# Patient Record
Sex: FEMALE | Race: AMERICAN INDIAN OR ALASKA NATIVE | NOT HISPANIC OR LATINO
[De-identification: names, ages, dates, MRNs, and addresses within clinical notes are randomized per-mention and may not be internally consistent; named-entity substitution may affect disease eponyms.]

---

## 2017-07-20 PROBLEM — Z00.00 ENCOUNTER FOR PREVENTIVE HEALTH EXAMINATION: Status: ACTIVE | Noted: 2017-07-20

## 2017-07-24 ENCOUNTER — RESULT CHARGE (OUTPATIENT)
Age: 32
End: 2017-07-24

## 2017-07-24 ENCOUNTER — APPOINTMENT (OUTPATIENT)
Dept: OBGYN | Facility: CLINIC | Age: 32
End: 2017-07-24

## 2017-07-24 ENCOUNTER — RESULT REVIEW (OUTPATIENT)
Age: 32
End: 2017-07-24

## 2017-07-24 ENCOUNTER — OUTPATIENT (OUTPATIENT)
Dept: OUTPATIENT SERVICES | Facility: HOSPITAL | Age: 32
LOS: 1 days | Discharge: HOME | End: 2017-07-24

## 2017-07-24 VITALS
SYSTOLIC BLOOD PRESSURE: 100 MMHG | DIASTOLIC BLOOD PRESSURE: 70 MMHG | HEIGHT: 62 IN | WEIGHT: 134 LBS | BODY MASS INDEX: 24.66 KG/M2

## 2017-07-24 LAB
BILIRUB UR QL STRIP: NORMAL
CLARITY UR: CLEAR
COLLECTION METHOD: NORMAL
GLUCOSE UR-MCNC: NORMAL
HCG UR QL: 0.2 EU/DL
HGB UR QL STRIP.AUTO: NORMAL
KETONES UR-MCNC: NORMAL
LEUKOCYTE ESTERASE UR QL STRIP: NORMAL
NITRITE UR QL STRIP: NORMAL
PH UR STRIP: 6.5
PROT UR STRIP-MCNC: NORMAL
SP GR UR STRIP: 1.01

## 2017-07-25 ENCOUNTER — RESULT REVIEW (OUTPATIENT)
Age: 32
End: 2017-07-25

## 2017-07-28 LAB — HPV E6+E7 MRNA CVX QL NAA+PROBE: NOT DETECTED

## 2017-08-02 ENCOUNTER — APPOINTMENT (OUTPATIENT)
Dept: ANTEPARTUM | Facility: CLINIC | Age: 32
End: 2017-08-02

## 2017-08-02 DIAGNOSIS — Z3A.12 12 WEEKS GESTATION OF PREGNANCY: ICD-10-CM

## 2017-08-03 DIAGNOSIS — O26.841 UTERINE SIZE-DATE DISCREPANCY, FIRST TRIMESTER: ICD-10-CM

## 2017-08-07 ENCOUNTER — RESULT REVIEW (OUTPATIENT)
Age: 32
End: 2017-08-07

## 2017-08-21 ENCOUNTER — APPOINTMENT (OUTPATIENT)
Dept: OBGYN | Facility: CLINIC | Age: 32
End: 2017-08-21

## 2017-08-21 ENCOUNTER — OUTPATIENT (OUTPATIENT)
Dept: OUTPATIENT SERVICES | Facility: HOSPITAL | Age: 32
LOS: 1 days | Discharge: HOME | End: 2017-08-21

## 2017-08-21 VITALS — WEIGHT: 134 LBS | DIASTOLIC BLOOD PRESSURE: 60 MMHG | BODY MASS INDEX: 24.51 KG/M2 | SYSTOLIC BLOOD PRESSURE: 104 MMHG

## 2017-08-23 ENCOUNTER — APPOINTMENT (OUTPATIENT)
Dept: ANTEPARTUM | Facility: CLINIC | Age: 32
End: 2017-08-23

## 2017-08-23 ENCOUNTER — OUTPATIENT (OUTPATIENT)
Dept: OUTPATIENT SERVICES | Facility: HOSPITAL | Age: 32
LOS: 1 days | Discharge: HOME | End: 2017-08-23

## 2017-08-25 ENCOUNTER — RESULT REVIEW (OUTPATIENT)
Age: 32
End: 2017-08-25

## 2017-08-28 LAB
ABO + RH BLD: NORMAL
BACTERIA UR CULT: NORMAL
BASOPHILS # BLD: 0.01 TH/MM3
BASOPHILS NFR BLD: 0.2 %
BLD GP AB SCN SERPL QL: NEGATIVE
DIFFERENTIAL METHOD BLD: NORMAL
EOSINOPHIL # BLD: 0.02 TH/MM3
EOSINOPHIL NFR BLD: 0.4 %
ERYTHROCYTE [DISTWIDTH] IN BLOOD BY AUTOMATED COUNT: 12.2 %
GAMMA INTERFERON BACKGROUND BLD IA-ACNC: 0.81 IU/ML
GRANULOCYTES # BLD: 3.87 TH/MM3
GRANULOCYTES NFR BLD: 68.8 %
HCT VFR BLD AUTO: 37.3 %
HGB BLD-MCNC: 12.8 G/DL
IMM GRANULOCYTES # BLD: 0.01 TH/MM3
IMM GRANULOCYTES NFR BLD: 0.2 %
LYMPHOCYTES # BLD: 1.25 TH/MM3
LYMPHOCYTES NFR BLD: 22.2 %
M TB IFN-G BLD-IMP: NEGATIVE
M TB IFN-G CD4+ T-CELLS BLD-ACNC: 0.05 IU/ML
MCH RBC QN AUTO: 31.8 PG
MCHC RBC AUTO-ENTMCNC: 34.3 G/DL
MCV RBC AUTO: 92.6 FL
MITOGEN IGNF BLD-ACNC: 5.83 IU/ML
MONOCYTES # BLD: 0.46 TH/MM3
MONOCYTES NFR BLD: 8.2 %
PLATELET # BLD: 191 TH/MM3
PMV BLD AUTO: 10.2 FL
RBC # BLD AUTO: 4.03 MIL/MM3
RPR SER QL: NONREACTIVE
WBC # BLD: 5.62 TH/MM3

## 2017-08-29 LAB
HBV SURFACE AG SER-ACNC: NONREACTIVE
HGB A MFR BLD: 97.1 %
HGB A2 MFR BLD: 2.9 %
HGB FRACT BLD ELPH CITRATE-IMP: NORMAL
RUBV IGG SER-ACNC: 3.9 INDEX
RUBV IGG SER-IMP: POSITIVE
VZV IGG FLD QL IA: 395.5 INDEX
VZV IGG FLD QL IA: POSITIVE

## 2017-09-06 LAB
AR GENE MUT ANL BLD/T: NORMAL
CFTR MUT ANL BLD/T: NEGATIVE
HIV1+2 AB SPEC QL IA.RAPID: NONREACTIVE
LEAD BLD-MCNC: <1 MCG/DL
SMN1(NORTH): NORMAL
SMN2(NORTH): NORMAL
SPECIMEN SOURCE: NORMAL
TECHNICAL RESULTS(NORTH): NORMAL

## 2017-09-15 ENCOUNTER — RESULT REVIEW (OUTPATIENT)
Age: 32
End: 2017-09-15

## 2017-09-15 LAB
C TRACH RRNA SPEC QL NAA+PROBE: NOT DETECTED
N GONORRHOEA RRNA SPEC QL NAA+PROBE: NOT DETECTED

## 2017-09-18 ENCOUNTER — APPOINTMENT (OUTPATIENT)
Dept: OBGYN | Facility: CLINIC | Age: 32
End: 2017-09-18

## 2017-09-18 ENCOUNTER — OUTPATIENT (OUTPATIENT)
Dept: OUTPATIENT SERVICES | Facility: HOSPITAL | Age: 32
LOS: 1 days | Discharge: HOME | End: 2017-09-18

## 2017-09-18 ENCOUNTER — RESULT CHARGE (OUTPATIENT)
Age: 32
End: 2017-09-18

## 2017-09-18 VITALS — WEIGHT: 139 LBS | BODY MASS INDEX: 25.42 KG/M2 | SYSTOLIC BLOOD PRESSURE: 90 MMHG | DIASTOLIC BLOOD PRESSURE: 60 MMHG

## 2017-09-18 LAB
BILIRUB UR QL STRIP: NEGATIVE
CLARITY UR: CLEAR
COLLECTION METHOD: NORMAL
GLUCOSE UR-MCNC: NEGATIVE
HCG UR QL: 0.2 EU/DL
HGB UR QL STRIP.AUTO: NEGATIVE
KETONES UR-MCNC: NEGATIVE
LEUKOCYTE ESTERASE UR QL STRIP: NEGATIVE
NITRITE UR QL STRIP: NEGATIVE
PH UR STRIP: 6.5
PROT UR STRIP-MCNC: NEGATIVE
SP GR UR STRIP: 1.01

## 2017-09-21 ENCOUNTER — RESULT REVIEW (OUTPATIENT)
Age: 32
End: 2017-09-21

## 2017-09-22 DIAGNOSIS — Z33.1 PREGNANT STATE, INCIDENTAL: ICD-10-CM

## 2017-09-27 LAB — MATERNAL ALPHA FETO (NORTH): NORMAL

## 2017-10-16 ENCOUNTER — RESULT CHARGE (OUTPATIENT)
Age: 32
End: 2017-10-16

## 2017-10-16 ENCOUNTER — APPOINTMENT (OUTPATIENT)
Dept: ANTEPARTUM | Facility: CLINIC | Age: 32
End: 2017-10-16

## 2017-10-16 ENCOUNTER — APPOINTMENT (OUTPATIENT)
Dept: OBGYN | Facility: CLINIC | Age: 32
End: 2017-10-16

## 2017-10-16 ENCOUNTER — OUTPATIENT (OUTPATIENT)
Dept: OUTPATIENT SERVICES | Facility: HOSPITAL | Age: 32
LOS: 1 days | Discharge: HOME | End: 2017-10-16

## 2017-10-16 VITALS — WEIGHT: 141 LBS | DIASTOLIC BLOOD PRESSURE: 60 MMHG | SYSTOLIC BLOOD PRESSURE: 100 MMHG | BODY MASS INDEX: 25.79 KG/M2

## 2017-10-18 DIAGNOSIS — O35.8XX0 MATERNAL CARE FOR OTHER (SUSPECTED) FETAL ABNORMALITY AND DAMAGE, NOT APPLICABLE OR UNSPECIFIED: ICD-10-CM

## 2017-10-18 DIAGNOSIS — Z33.1 PREGNANT STATE, INCIDENTAL: ICD-10-CM

## 2017-11-13 ENCOUNTER — OUTPATIENT (OUTPATIENT)
Dept: OUTPATIENT SERVICES | Facility: HOSPITAL | Age: 32
LOS: 1 days | Discharge: HOME | End: 2017-11-13

## 2017-11-13 ENCOUNTER — APPOINTMENT (OUTPATIENT)
Dept: OBGYN | Facility: CLINIC | Age: 32
End: 2017-11-13

## 2017-11-13 VITALS — BODY MASS INDEX: 27.18 KG/M2 | WEIGHT: 148.6 LBS

## 2017-11-13 DIAGNOSIS — Z3A.12 12 WEEKS GESTATION OF PREGNANCY: ICD-10-CM

## 2017-11-13 DIAGNOSIS — Z33.1 PREGNANT STATE, INCIDENTAL: ICD-10-CM

## 2017-11-13 LAB
BILIRUB UR QL STRIP: NEGATIVE
CLARITY UR: CLEAR
COLLECTION METHOD: NORMAL
GLUCOSE UR-MCNC: NEGATIVE
HCG UR QL: NEGATIVE EU/DL
HGB UR QL STRIP.AUTO: NORMAL
KETONES UR-MCNC: NORMAL
LEUKOCYTE ESTERASE UR QL STRIP: NEGATIVE
NITRITE UR QL STRIP: NEGATIVE
PH UR STRIP: 6.5
PROT UR STRIP-MCNC: NEGATIVE
SP GR UR STRIP: 1.02

## 2017-11-28 LAB
BASOPHILS # BLD: 0.01 TH/MM3
BASOPHILS NFR BLD: 0.1 %
DIFFERENTIAL METHOD BLD: NORMAL
EOSINOPHIL # BLD: 0.12 TH/MM3
EOSINOPHIL NFR BLD: 1.4 %
ERYTHROCYTE [DISTWIDTH] IN BLOOD BY AUTOMATED COUNT: 13 %
GLUCOSE 1H P GLC SERPL-MCNC: 138 MG/DL
GRANULOCYTES # BLD: 6.47 TH/MM3
GRANULOCYTES NFR BLD: 73.3 %
HCT VFR BLD AUTO: 36.4 %
HGB BLD-MCNC: 12.1 G/DL
IMM GRANULOCYTES # BLD: 0.06 TH/MM3
IMM GRANULOCYTES NFR BLD: 0.7 %
LYMPHOCYTES # BLD: 1.57 TH/MM3
LYMPHOCYTES NFR BLD: 17.8 %
MCH RBC QN AUTO: 32.7 PG
MCHC RBC AUTO-ENTMCNC: 33.2 G/DL
MCV RBC AUTO: 98.4 FL
MONOCYTES # BLD: 0.59 TH/MM3
MONOCYTES NFR BLD: 6.7 %
PLATELET # BLD: 219 TH/MM3
PMV BLD AUTO: 10.2 FL
RBC # BLD AUTO: 3.7 MIL/MM3
WBC # BLD: 8.82 TH/MM3

## 2017-12-11 ENCOUNTER — OUTPATIENT (OUTPATIENT)
Dept: OUTPATIENT SERVICES | Facility: HOSPITAL | Age: 32
LOS: 1 days | Discharge: HOME | End: 2017-12-11

## 2017-12-11 ENCOUNTER — APPOINTMENT (OUTPATIENT)
Dept: OBGYN | Facility: CLINIC | Age: 32
End: 2017-12-11

## 2017-12-11 VITALS — WEIGHT: 154 LBS | DIASTOLIC BLOOD PRESSURE: 60 MMHG | BODY MASS INDEX: 28.17 KG/M2 | SYSTOLIC BLOOD PRESSURE: 100 MMHG

## 2017-12-11 LAB
BILIRUB UR QL STRIP: NEGATIVE
CLARITY UR: CLEAR
COLLECTION METHOD: NORMAL
GLUCOSE UR-MCNC: NEGATIVE
HCG UR QL: NEGATIVE EU/DL
HGB UR QL STRIP.AUTO: NEGATIVE
KETONES UR-MCNC: NEGATIVE
LEUKOCYTE ESTERASE UR QL STRIP: NEGATIVE
NITRITE UR QL STRIP: NEGATIVE
PH UR STRIP: 6.5
PROT UR STRIP-MCNC: NEGATIVE
SP GR UR STRIP: 1.03

## 2018-01-08 ENCOUNTER — OUTPATIENT (OUTPATIENT)
Dept: OUTPATIENT SERVICES | Facility: HOSPITAL | Age: 33
LOS: 1 days | Discharge: HOME | End: 2018-01-08

## 2018-01-08 ENCOUNTER — APPOINTMENT (OUTPATIENT)
Dept: OBGYN | Facility: CLINIC | Age: 33
End: 2018-01-08

## 2018-01-08 VITALS — BODY MASS INDEX: 29.26 KG/M2 | SYSTOLIC BLOOD PRESSURE: 120 MMHG | WEIGHT: 160 LBS | DIASTOLIC BLOOD PRESSURE: 80 MMHG

## 2018-01-08 VITALS — SYSTOLIC BLOOD PRESSURE: 120 MMHG | WEIGHT: 160.1 LBS | BODY MASS INDEX: 29.28 KG/M2 | DIASTOLIC BLOOD PRESSURE: 80 MMHG

## 2018-01-22 ENCOUNTER — APPOINTMENT (OUTPATIENT)
Dept: OBGYN | Facility: CLINIC | Age: 33
End: 2018-01-22

## 2018-01-22 ENCOUNTER — OUTPATIENT (OUTPATIENT)
Dept: OUTPATIENT SERVICES | Facility: HOSPITAL | Age: 33
LOS: 1 days | Discharge: HOME | End: 2018-01-22

## 2018-01-22 ENCOUNTER — RESULT CHARGE (OUTPATIENT)
Age: 33
End: 2018-01-22

## 2018-01-22 VITALS — SYSTOLIC BLOOD PRESSURE: 110 MMHG | BODY MASS INDEX: 29.81 KG/M2 | WEIGHT: 163 LBS | DIASTOLIC BLOOD PRESSURE: 60 MMHG

## 2018-01-22 LAB
BILIRUB UR QL STRIP: NEGATIVE
CLARITY UR: CLEAR
COLLECTION METHOD: NORMAL
GLUCOSE UR-MCNC: NEGATIVE
HCG UR QL: NEGATIVE EU/DL
HGB UR QL STRIP.AUTO: NEGATIVE
KETONES UR-MCNC: NEGATIVE
LEUKOCYTE ESTERASE UR QL STRIP: NEGATIVE
NITRITE UR QL STRIP: NEGATIVE
PH UR STRIP: 6
PROT UR STRIP-MCNC: NEGATIVE
SP GR UR STRIP: 1.02

## 2018-01-24 ENCOUNTER — APPOINTMENT (OUTPATIENT)
Dept: ANTEPARTUM | Facility: CLINIC | Age: 33
End: 2018-01-24

## 2018-01-24 DIAGNOSIS — O40.3XX1 POLYHYDRAMNIOS, THIRD TRIMESTER, FETUS 1: ICD-10-CM

## 2018-01-24 DIAGNOSIS — O26.843 UTERINE SIZE-DATE DISCREPANCY, THIRD TRIMESTER: ICD-10-CM

## 2018-01-24 DIAGNOSIS — Z3A.35 35 WEEKS GESTATION OF PREGNANCY: ICD-10-CM

## 2018-01-24 DIAGNOSIS — Z34.90 ENCOUNTER FOR SUPERVISION OF NORMAL PREGNANCY, UNSPECIFIED, UNSPECIFIED TRIMESTER: ICD-10-CM

## 2018-01-29 ENCOUNTER — RESULT REVIEW (OUTPATIENT)
Age: 33
End: 2018-01-29

## 2018-01-29 ENCOUNTER — OUTPATIENT (OUTPATIENT)
Dept: OUTPATIENT SERVICES | Facility: HOSPITAL | Age: 33
LOS: 1 days | Discharge: HOME | End: 2018-01-29

## 2018-01-29 ENCOUNTER — APPOINTMENT (OUTPATIENT)
Dept: OBGYN | Facility: CLINIC | Age: 33
End: 2018-01-29

## 2018-01-29 VITALS — SYSTOLIC BLOOD PRESSURE: 112 MMHG | DIASTOLIC BLOOD PRESSURE: 68 MMHG | BODY MASS INDEX: 30.18 KG/M2 | WEIGHT: 165 LBS

## 2018-01-30 LAB
BASOPHILS # BLD: 0.01 TH/MM3
BASOPHILS NFR BLD: 0.1 %
CHLAMYDIA TRACHOMATIS(SIUH): NOT DETECTED
DIFFERENTIAL METHOD BLD: NORMAL
EOSINOPHIL # BLD: 0.1 TH/MM3
EOSINOPHIL NFR BLD: 0.9 %
ERYTHROCYTE [DISTWIDTH] IN BLOOD BY AUTOMATED COUNT: 12.4 %
GRANULOCYTES # BLD: 7.53 TH/MM3
GRANULOCYTES NFR BLD: 71 %
HCT VFR BLD AUTO: 36.3 %
HGB BLD-MCNC: 12.2 G/DL
IMM GRANULOCYTES # BLD: 0.06 TH/MM3
IMM GRANULOCYTES NFR BLD: 0.6 %
LYMPHOCYTES # BLD: 2.04 TH/MM3
LYMPHOCYTES NFR BLD: 19.2 %
MCH RBC QN AUTO: 31.8 PG
MCHC RBC AUTO-ENTMCNC: 33.6 G/DL
MCV RBC AUTO: 94.5 FL
MONOCYTES # BLD: 0.87 TH/MM3
MONOCYTES NFR BLD: 8.2 %
N GONORRHOEA RRNA SPEC QL NAA+PROBE: NOT DETECTED
PLATELET # BLD: 216 TH/MM3
PMV BLD AUTO: 10.1 FL
RBC # BLD AUTO: 3.84 MIL/MM3
WBC # BLD: 10.61 TH/MM3

## 2018-01-31 ENCOUNTER — APPOINTMENT (OUTPATIENT)
Dept: ANTEPARTUM | Facility: CLINIC | Age: 33
End: 2018-01-31

## 2018-01-31 ENCOUNTER — OUTPATIENT (OUTPATIENT)
Dept: OUTPATIENT SERVICES | Facility: HOSPITAL | Age: 33
LOS: 1 days | Discharge: HOME | End: 2018-01-31

## 2018-01-31 LAB
GP B STREP DNA SPEC QL NAA+PROBE: NORMAL
HIV1+2 AB SPEC QL IA.RAPID: NONREACTIVE
S PYO DNA THROAT QL NAA+PROBE: DETECTED
SPECIMEN SOURCE: NORMAL

## 2018-02-05 ENCOUNTER — OUTPATIENT (OUTPATIENT)
Dept: OUTPATIENT SERVICES | Facility: HOSPITAL | Age: 33
LOS: 1 days | Discharge: HOME | End: 2018-02-05

## 2018-02-05 ENCOUNTER — RESULT CHARGE (OUTPATIENT)
Age: 33
End: 2018-02-05

## 2018-02-05 ENCOUNTER — APPOINTMENT (OUTPATIENT)
Dept: OBGYN | Facility: CLINIC | Age: 33
End: 2018-02-05

## 2018-02-05 VITALS — DIASTOLIC BLOOD PRESSURE: 60 MMHG | WEIGHT: 293 LBS | SYSTOLIC BLOOD PRESSURE: 110 MMHG | BODY MASS INDEX: 301.24 KG/M2

## 2018-02-05 LAB
BILIRUB UR QL STRIP: NEGATIVE
CLARITY UR: CLEAR
COLLECTION METHOD: NORMAL
GLUCOSE UR-MCNC: 500
HCG UR QL: NEGATIVE EU/DL
HGB UR QL STRIP.AUTO: NEGATIVE
KETONES UR-MCNC: NEGATIVE
LEUKOCYTE ESTERASE UR QL STRIP: NEGATIVE
NITRITE UR QL STRIP: NEGATIVE
PH UR STRIP: 6.5
PROT UR STRIP-MCNC: NEGATIVE
SP GR UR STRIP: 1.03

## 2018-02-07 ENCOUNTER — OUTPATIENT (OUTPATIENT)
Dept: OUTPATIENT SERVICES | Facility: HOSPITAL | Age: 33
LOS: 1 days | Discharge: HOME | End: 2018-02-07

## 2018-02-07 ENCOUNTER — APPOINTMENT (OUTPATIENT)
Dept: ANTEPARTUM | Facility: CLINIC | Age: 33
End: 2018-02-07

## 2018-02-08 DIAGNOSIS — O40.3XX1 POLYHYDRAMNIOS, THIRD TRIMESTER, FETUS 1: ICD-10-CM

## 2018-02-08 DIAGNOSIS — Z3A.37 37 WEEKS GESTATION OF PREGNANCY: ICD-10-CM

## 2018-02-08 DIAGNOSIS — O34.211 MATERNAL CARE FOR LOW TRANSVERSE SCAR FROM PREVIOUS CESAREAN DELIVERY: ICD-10-CM

## 2018-02-12 ENCOUNTER — APPOINTMENT (OUTPATIENT)
Dept: OBGYN | Facility: CLINIC | Age: 33
End: 2018-02-12

## 2018-02-12 ENCOUNTER — RESULT CHARGE (OUTPATIENT)
Age: 33
End: 2018-02-12

## 2018-02-12 ENCOUNTER — OUTPATIENT (OUTPATIENT)
Dept: OUTPATIENT SERVICES | Facility: HOSPITAL | Age: 33
LOS: 1 days | Discharge: HOME | End: 2018-02-12

## 2018-02-12 VITALS — BODY MASS INDEX: 30.91 KG/M2 | SYSTOLIC BLOOD PRESSURE: 108 MMHG | DIASTOLIC BLOOD PRESSURE: 68 MMHG | WEIGHT: 169 LBS

## 2018-02-12 LAB
BILIRUB UR QL STRIP: NEGATIVE
CLARITY UR: CLEAR
COLLECTION METHOD: NORMAL
GLUCOSE UR-MCNC: 250
HCG UR QL: NEGATIVE EU/DL
HGB UR QL STRIP.AUTO: NEGATIVE
KETONES UR-MCNC: NEGATIVE
LEUKOCYTE ESTERASE UR QL STRIP: NEGATIVE
NITRITE UR QL STRIP: NEGATIVE
PH UR STRIP: 7
PROT UR STRIP-MCNC: NEGATIVE
SP GR UR STRIP: 1.02

## 2018-02-14 ENCOUNTER — OUTPATIENT (OUTPATIENT)
Dept: OUTPATIENT SERVICES | Facility: HOSPITAL | Age: 33
LOS: 1 days | Discharge: HOME | End: 2018-02-14

## 2018-02-14 ENCOUNTER — APPOINTMENT (OUTPATIENT)
Dept: ANTEPARTUM | Facility: CLINIC | Age: 33
End: 2018-02-14

## 2018-02-14 DIAGNOSIS — O34.219 MATERNAL CARE FOR UNSPECIFIED TYPE SCAR FROM PREVIOUS CESAREAN DELIVERY: ICD-10-CM

## 2018-02-14 DIAGNOSIS — O40.3XX1 POLYHYDRAMNIOS, THIRD TRIMESTER, FETUS 1: ICD-10-CM

## 2018-02-15 ENCOUNTER — LABORATORY RESULT (OUTPATIENT)
Age: 33
End: 2018-02-15

## 2018-02-21 ENCOUNTER — RESULT CHARGE (OUTPATIENT)
Age: 33
End: 2018-02-21

## 2018-02-21 ENCOUNTER — OUTPATIENT (OUTPATIENT)
Dept: OUTPATIENT SERVICES | Facility: HOSPITAL | Age: 33
LOS: 1 days | Discharge: HOME | End: 2018-02-21

## 2018-02-21 ENCOUNTER — APPOINTMENT (OUTPATIENT)
Dept: ANTEPARTUM | Facility: CLINIC | Age: 33
End: 2018-02-21

## 2018-02-21 ENCOUNTER — APPOINTMENT (OUTPATIENT)
Dept: OBGYN | Facility: CLINIC | Age: 33
End: 2018-02-21

## 2018-02-21 VITALS
WEIGHT: 167.38 LBS | BODY MASS INDEX: 30.8 KG/M2 | DIASTOLIC BLOOD PRESSURE: 60 MMHG | HEIGHT: 62 IN | SYSTOLIC BLOOD PRESSURE: 106 MMHG

## 2018-02-21 DIAGNOSIS — O40.3XX1 POLYHYDRAMNIOS, THIRD TRIMESTER, FETUS 1: ICD-10-CM

## 2018-02-21 DIAGNOSIS — Z3A.39 39 WEEKS GESTATION OF PREGNANCY: ICD-10-CM

## 2018-02-21 DIAGNOSIS — O34.219 MATERNAL CARE FOR UNSPECIFIED TYPE SCAR FROM PREVIOUS CESAREAN DELIVERY: ICD-10-CM

## 2018-02-21 LAB
BILIRUB UR QL STRIP: NORMAL
CLARITY UR: CLEAR
COLLECTION METHOD: NORMAL
GLUCOSE UR-MCNC: NORMAL
HCG UR QL: 0.2 EU/DL
HGB UR QL STRIP.AUTO: NORMAL
KETONES UR-MCNC: NORMAL
LEUKOCYTE ESTERASE UR QL STRIP: NORMAL
NITRITE UR QL STRIP: NORMAL
PH UR STRIP: 6
PROT UR STRIP-MCNC: NORMAL
SP GR UR STRIP: 1.03

## 2018-02-22 DIAGNOSIS — Z34.90 ENCOUNTER FOR SUPERVISION OF NORMAL PREGNANCY, UNSPECIFIED, UNSPECIFIED TRIMESTER: ICD-10-CM

## 2018-02-26 ENCOUNTER — OUTPATIENT (OUTPATIENT)
Dept: OUTPATIENT SERVICES | Facility: HOSPITAL | Age: 33
LOS: 1 days | Discharge: HOME | End: 2018-02-26

## 2018-02-26 ENCOUNTER — RESULT CHARGE (OUTPATIENT)
Age: 33
End: 2018-02-26

## 2018-02-26 ENCOUNTER — APPOINTMENT (OUTPATIENT)
Dept: OBGYN | Facility: CLINIC | Age: 33
End: 2018-02-26

## 2018-02-26 VITALS — SYSTOLIC BLOOD PRESSURE: 106 MMHG | DIASTOLIC BLOOD PRESSURE: 62 MMHG

## 2018-02-27 ENCOUNTER — INPATIENT (INPATIENT)
Facility: HOSPITAL | Age: 33
LOS: 2 days | Discharge: HOME | End: 2018-03-02
Attending: OBSTETRICS & GYNECOLOGY | Admitting: OBSTETRICS & GYNECOLOGY

## 2018-02-27 ENCOUNTER — RESULT REVIEW (OUTPATIENT)
Age: 33
End: 2018-02-27

## 2018-02-27 VITALS — SYSTOLIC BLOOD PRESSURE: 128 MMHG | TEMPERATURE: 99 F | DIASTOLIC BLOOD PRESSURE: 73 MMHG

## 2018-02-27 LAB
APPEARANCE UR: (no result)
BASOPHILS # BLD AUTO: 0.02 K/UL — SIGNIFICANT CHANGE UP (ref 0–0.2)
BASOPHILS NFR BLD AUTO: 0.3 % — SIGNIFICANT CHANGE UP (ref 0–1)
BILIRUB UR-MCNC: NEGATIVE — SIGNIFICANT CHANGE UP
BLD GP AB SCN SERPL QL: SIGNIFICANT CHANGE UP
COLOR SPEC: YELLOW — SIGNIFICANT CHANGE UP
DIFF PNL FLD: NEGATIVE — SIGNIFICANT CHANGE UP
EOSINOPHIL # BLD AUTO: 0.09 K/UL — SIGNIFICANT CHANGE UP (ref 0–0.7)
EOSINOPHIL NFR BLD AUTO: 1.1 % — SIGNIFICANT CHANGE UP (ref 0–8)
EPI CELLS # UR: (no result) /HPF
GLUCOSE UR QL: NEGATIVE MG/DL — SIGNIFICANT CHANGE UP
HCT VFR BLD CALC: 37.8 % — SIGNIFICANT CHANGE UP (ref 37–47)
HGB BLD-MCNC: 12.6 G/DL — LOW (ref 14–18)
IMM GRANULOCYTES NFR BLD AUTO: 0.4 % — HIGH (ref 0.1–0.3)
KETONES UR-MCNC: NEGATIVE — SIGNIFICANT CHANGE UP
LEUKOCYTE ESTERASE UR-ACNC: NEGATIVE — SIGNIFICANT CHANGE UP
LYMPHOCYTES # BLD AUTO: 1.9 K/UL — SIGNIFICANT CHANGE UP (ref 1.2–3.4)
LYMPHOCYTES # BLD AUTO: 24.2 % — SIGNIFICANT CHANGE UP (ref 20.5–51.1)
MCHC RBC-ENTMCNC: 30.7 PG — SIGNIFICANT CHANGE UP (ref 27–31)
MCHC RBC-ENTMCNC: 33.3 G/DL — SIGNIFICANT CHANGE UP (ref 32–37)
MCV RBC AUTO: 92 FL — HIGH (ref 81–91)
MONOCYTES # BLD AUTO: 0.63 K/UL — HIGH (ref 0.1–0.6)
MONOCYTES NFR BLD AUTO: 8 % — SIGNIFICANT CHANGE UP (ref 1.7–9.3)
NEUTROPHILS # BLD AUTO: 5.17 K/UL — SIGNIFICANT CHANGE UP (ref 1.4–6.5)
NEUTROPHILS NFR BLD AUTO: 66 % — SIGNIFICANT CHANGE UP (ref 42.2–75.2)
NITRITE UR-MCNC: NEGATIVE — SIGNIFICANT CHANGE UP
NRBC # BLD: 0 /100 WBCS — SIGNIFICANT CHANGE UP (ref 0–0)
PH UR: 6 — SIGNIFICANT CHANGE UP (ref 5–8)
PLATELET # BLD AUTO: 219 K/UL — SIGNIFICANT CHANGE UP (ref 130–400)
PRENATAL SYPHILIS TEST: SIGNIFICANT CHANGE UP
PROT UR-MCNC: NEGATIVE MG/DL — SIGNIFICANT CHANGE UP
RBC # BLD: 4.11 M/UL — LOW (ref 4.2–5.4)
RBC # FLD: 13.1 % — SIGNIFICANT CHANGE UP (ref 11.5–14.5)
SP GR SPEC: 1.02 — SIGNIFICANT CHANGE UP (ref 1.01–1.03)
TYPE + AB SCN PNL BLD: SIGNIFICANT CHANGE UP
UROBILINOGEN FLD QL: 0.2 MG/DL — SIGNIFICANT CHANGE UP (ref 0.2–0.2)
WBC # BLD: 7.84 K/UL — SIGNIFICANT CHANGE UP (ref 4.8–10.8)
WBC # FLD AUTO: 7.84 K/UL — SIGNIFICANT CHANGE UP (ref 4.8–10.8)

## 2018-02-27 RX ORDER — CEFAZOLIN SODIUM 1 G
2000 VIAL (EA) INJECTION ONCE
Qty: 0 | Refills: 0 | Status: COMPLETED | OUTPATIENT
Start: 2018-02-27 | End: 2018-02-27

## 2018-02-27 RX ORDER — LANOLIN
1 OINTMENT (GRAM) TOPICAL
Qty: 0 | Refills: 0 | Status: DISCONTINUED | OUTPATIENT
Start: 2018-02-27 | End: 2018-03-02

## 2018-02-27 RX ORDER — SODIUM CHLORIDE 9 MG/ML
1000 INJECTION, SOLUTION INTRAVENOUS
Qty: 0 | Refills: 0 | Status: DISCONTINUED | OUTPATIENT
Start: 2018-02-27 | End: 2018-03-02

## 2018-02-27 RX ORDER — CITRIC ACID/SODIUM CITRATE 300-500 MG
15 SOLUTION, ORAL ORAL EVERY 4 HOURS
Qty: 0 | Refills: 0 | Status: DISCONTINUED | OUTPATIENT
Start: 2018-02-27 | End: 2018-02-27

## 2018-02-27 RX ORDER — SIMETHICONE 80 MG/1
80 TABLET, CHEWABLE ORAL EVERY 4 HOURS
Qty: 0 | Refills: 0 | Status: DISCONTINUED | OUTPATIENT
Start: 2018-02-27 | End: 2018-03-02

## 2018-02-27 RX ORDER — ACETAMINOPHEN 500 MG
650 TABLET ORAL EVERY 6 HOURS
Qty: 0 | Refills: 0 | Status: DISCONTINUED | OUTPATIENT
Start: 2018-02-27 | End: 2018-03-02

## 2018-02-27 RX ORDER — OXYCODONE AND ACETAMINOPHEN 5; 325 MG/1; MG/1
2 TABLET ORAL EVERY 6 HOURS
Qty: 0 | Refills: 0 | Status: DISCONTINUED | OUTPATIENT
Start: 2018-02-27 | End: 2018-03-02

## 2018-02-27 RX ORDER — OXYTOCIN 10 UNIT/ML
333.33 VIAL (ML) INJECTION
Qty: 20 | Refills: 0 | Status: DISCONTINUED | OUTPATIENT
Start: 2018-02-27 | End: 2018-02-27

## 2018-02-27 RX ORDER — FERROUS SULFATE 325(65) MG
325 TABLET ORAL DAILY
Qty: 0 | Refills: 0 | Status: DISCONTINUED | OUTPATIENT
Start: 2018-02-27 | End: 2018-03-02

## 2018-02-27 RX ORDER — DOCUSATE SODIUM 100 MG
100 CAPSULE ORAL
Qty: 0 | Refills: 0 | Status: DISCONTINUED | OUTPATIENT
Start: 2018-02-27 | End: 2018-03-02

## 2018-02-27 RX ORDER — SODIUM CHLORIDE 9 MG/ML
1000 INJECTION, SOLUTION INTRAVENOUS
Qty: 0 | Refills: 0 | Status: DISCONTINUED | OUTPATIENT
Start: 2018-02-27 | End: 2018-02-27

## 2018-02-27 RX ORDER — ENOXAPARIN SODIUM 100 MG/ML
40 INJECTION SUBCUTANEOUS DAILY
Qty: 0 | Refills: 0 | Status: DISCONTINUED | OUTPATIENT
Start: 2018-02-27 | End: 2018-03-02

## 2018-02-27 RX ORDER — DIPHENHYDRAMINE HCL 50 MG
25 CAPSULE ORAL EVERY 6 HOURS
Qty: 0 | Refills: 0 | Status: DISCONTINUED | OUTPATIENT
Start: 2018-02-27 | End: 2018-03-02

## 2018-02-27 RX ORDER — OXYTOCIN 10 UNIT/ML
41.67 VIAL (ML) INJECTION
Qty: 20 | Refills: 0 | Status: DISCONTINUED | OUTPATIENT
Start: 2018-02-27 | End: 2018-03-02

## 2018-02-27 RX ORDER — CEFAZOLIN SODIUM 1 G
1000 VIAL (EA) INJECTION EVERY 8 HOURS
Qty: 0 | Refills: 0 | Status: DISCONTINUED | OUTPATIENT
Start: 2018-02-27 | End: 2018-02-27

## 2018-02-27 RX ORDER — CEFAZOLIN SODIUM 1 G
VIAL (EA) INJECTION
Qty: 0 | Refills: 0 | Status: DISCONTINUED | OUTPATIENT
Start: 2018-02-27 | End: 2018-02-27

## 2018-02-27 RX ORDER — OXYCODONE AND ACETAMINOPHEN 5; 325 MG/1; MG/1
1 TABLET ORAL
Qty: 0 | Refills: 0 | Status: DISCONTINUED | OUTPATIENT
Start: 2018-02-27 | End: 2018-03-02

## 2018-02-27 RX ORDER — SODIUM CHLORIDE 9 MG/ML
300 INJECTION, SOLUTION INTRAVENOUS ONCE
Qty: 0 | Refills: 0 | Status: DISCONTINUED | OUTPATIENT
Start: 2018-02-27 | End: 2018-02-27

## 2018-02-27 RX ORDER — IBUPROFEN 200 MG
600 TABLET ORAL EVERY 6 HOURS
Qty: 0 | Refills: 0 | Status: DISCONTINUED | OUTPATIENT
Start: 2018-02-27 | End: 2018-03-02

## 2018-02-27 RX ORDER — CEFAZOLIN SODIUM 1 G
2 VIAL (EA) INJECTION EVERY 8 HOURS
Qty: 0 | Refills: 0 | Status: COMPLETED | OUTPATIENT
Start: 2018-02-27 | End: 2018-02-27

## 2018-02-27 RX ADMIN — SODIUM CHLORIDE 125 MILLILITER(S): 9 INJECTION, SOLUTION INTRAVENOUS at 08:10

## 2018-02-27 RX ADMIN — Medication 100 MILLIGRAM(S): at 21:44

## 2018-02-27 RX ADMIN — Medication 100 MILLIGRAM(S): at 08:09

## 2018-02-27 RX ADMIN — Medication 100 MILLIGRAM(S): at 15:44

## 2018-02-27 NOTE — PROGRESS NOTE ADULT - SUBJECTIVE AND OBJECTIVE BOX
JEANNINE JOHNSON  32y  Female    PGY1 Note:    Pt recovering well, no acute complaints.     Ambulating: No  Voiding: Benson in situ  Flatus: No  Bowel movements: No  Breast or bottle feeding: Breast feeding  Diet: Clears    MEDICATIONS  (STANDING):  ceFAZolin   IVPB 2 milliGRAM(s) IV Intermittent every 8 hours  enoxaparin Injectable 40 milliGRAM(s) SubCutaneous daily  ferrous    sulfate 325 milliGRAM(s) Oral daily  lactated ringers. 1000 milliLiter(s) (125 mL/Hr) IV Continuous <Continuous>  oxytocin Infusion 41.667 milliUNIT(s)/Min (125 mL/Hr) IV Continuous <Continuous>  prenatal multivitamin 1 Tablet(s) Oral daily    MEDICATIONS  (PRN):  acetaminophen   Tablet 650 milliGRAM(s) Oral every 6 hours PRN For Temp greater than 38.5 C (101.3 F)  diphenhydrAMINE   Capsule 25 milliGRAM(s) Oral every 6 hours PRN Itching  docusate sodium 100 milliGRAM(s) Oral two times a day PRN Stool Softening  ibuprofen  Tablet 600 milliGRAM(s) Oral every 6 hours PRN Mild pain or headache  lanolin Ointment 1 Application(s) Topical every 3 hours PRN Sore Nipples  oxyCODONE    5 mG/acetaminophen 325 mG 1 Tablet(s) Oral every 3 hours PRN Moderate Pain  oxyCODONE    5 mG/acetaminophen 325 mG 2 Tablet(s) Oral every 6 hours PRN Severe Pain  simethicone 80 milliGRAM(s) Chew every 4 hours PRN Gas      PAST MEDICAL & SURGICAL HISTORY:  No pertinent past medical history  Delivery of first pregnancy by  section      Physical Exam  Vital Signs Last 24 Hrs  T(C): 36 (2018 15:15), Max: 37.1 (2018 07:01)  T(F): 96.8 (2018 15:15), Max: 98.7 (2018 07:01)  HR: 66 (2018 15:15) (60 - 95)  BP: 98/50 (2018 15:15) (95/58 - 128/73)  RR: 20 (2018 13:59) (16 - 20)  Gen: AAOx3, NAD  Fundus: firm, below umbilicus   Wound: dressing in place, c/d/i   Abd: Soft, nontender, nondistended, BS+  Ext: No calf tenderness, no swelling, SCDs in situ   UO: 240cc/4h->60cc/hr (adequate)     Labs:                        12.6   7.84  )-----------( 219      ( 2018 06:53 )             37.8        A/P: S/P C/S POD0  , recovering well   - d/c benson as per duramorph at 0016 if UO remains adequate  - f/u AM CBC   - dressing change in AM, advance diet when passing flatus  - monitor vital signs, bleeding   - pain mgmt prn

## 2018-02-27 NOTE — OB PROVIDER H&P - HISTORY OF PRESENT ILLNESS
32y.o.  @ 39.6wks by 1st trimester sono presents for elective repeat . Pt denies ctx, ROM, VB and states good FM.

## 2018-02-27 NOTE — OB PROVIDER H&P - NS_OBGYNHISTORY_OBGYN_ALL_OB_FT
2012 FT  9lb2oz arrest of dilation in [facility], LFTCS-pt has vertical skin incision

## 2018-02-27 NOTE — PROGRESS NOTE ADULT - SUBJECTIVE AND OBJECTIVE BOX
Pt underwent an uncomplicated repeat  section via lft/uterine incision and vertical skin incision. Please see resident's note for details.

## 2018-02-27 NOTE — OB PROVIDER H&P - ASSESSMENT
32y.o.  @ 39.6wks for elective repeat , Suspected GDM, polyhydramnios  - Admit to L&D  - IVF, labs  - Continuous efm and toco  - SCD's  - Schaefer catheter  - Abdominal prep  - Ancef prior to OR  Dr. SELINA De La Fuente aware

## 2018-02-27 NOTE — BRIEF OPERATIVE NOTE - PRE-OP DX
39 weeks gestation of pregnancy  2018    Active  Abigail Nunez  History of  section  2018    Active  Abigail Nunez

## 2018-02-27 NOTE — BRIEF OPERATIVE NOTE - OPERATION/FINDINGS
normal appearing uterus, bilateral fallopian tubes and ovaries, live female infant in vertex presentation, meconium stained amniotic fluid, APGARs 9/9 at 1 and 5 minute respectively

## 2018-02-27 NOTE — OB PROVIDER H&P - NSHPLABSRESULTS_GEN_ALL_CORE
Sono @ 37wks vtx, MVP 9.2, BPP 8/8  Sono @ 36wks vtx, BPP 8/8, poly  Sono @ 35wks vtx, EFW: 2918g, 66%, BPP 8/8, Poly  Sono @ 20.5wks nl anatomy  Sono @ 13wks S=D, NT WNL  Sono @ 10wks For dating    2/15/18 Hgb A1c 5.8

## 2018-02-28 ENCOUNTER — APPOINTMENT (OUTPATIENT)
Dept: ANTEPARTUM | Facility: CLINIC | Age: 33
End: 2018-02-28

## 2018-02-28 DIAGNOSIS — Z34.90 ENCOUNTER FOR SUPERVISION OF NORMAL PREGNANCY, UNSPECIFIED, UNSPECIFIED TRIMESTER: ICD-10-CM

## 2018-02-28 LAB
AMPHET UR-MCNC: NEGATIVE — SIGNIFICANT CHANGE UP
BARBITURATES UR SCN-MCNC: NEGATIVE — SIGNIFICANT CHANGE UP
BASOPHILS # BLD AUTO: 0.02 K/UL — SIGNIFICANT CHANGE UP (ref 0–0.2)
BASOPHILS NFR BLD AUTO: 0.2 % — SIGNIFICANT CHANGE UP (ref 0–1)
BENZODIAZ UR-MCNC: NEGATIVE — SIGNIFICANT CHANGE UP
COCAINE METAB.OTHER UR-MCNC: NEGATIVE — SIGNIFICANT CHANGE UP
EOSINOPHIL # BLD AUTO: 0.07 K/UL — SIGNIFICANT CHANGE UP (ref 0–0.7)
EOSINOPHIL NFR BLD AUTO: 0.6 % — SIGNIFICANT CHANGE UP (ref 0–8)
HCT VFR BLD CALC: 31.6 % — LOW (ref 37–47)
HGB BLD-MCNC: 10.5 G/DL — LOW (ref 14–18)
IMM GRANULOCYTES NFR BLD AUTO: 0.5 % — HIGH (ref 0.1–0.3)
LYMPHOCYTES # BLD AUTO: 1.67 K/UL — SIGNIFICANT CHANGE UP (ref 1.2–3.4)
LYMPHOCYTES # BLD AUTO: 13.9 % — LOW (ref 20.5–51.1)
MCHC RBC-ENTMCNC: 30.4 PG — SIGNIFICANT CHANGE UP (ref 27–31)
MCHC RBC-ENTMCNC: 33.2 G/DL — SIGNIFICANT CHANGE UP (ref 32–37)
MCV RBC AUTO: 91.6 FL — HIGH (ref 81–91)
METHADONE UR-MCNC: NEGATIVE — SIGNIFICANT CHANGE UP
MONOCYTES # BLD AUTO: 0.63 K/UL — HIGH (ref 0.1–0.6)
MONOCYTES NFR BLD AUTO: 5.2 % — SIGNIFICANT CHANGE UP (ref 1.7–9.3)
NEUTROPHILS # BLD AUTO: 9.56 K/UL — HIGH (ref 1.4–6.5)
NEUTROPHILS NFR BLD AUTO: 79.6 % — HIGH (ref 42.2–75.2)
NRBC # BLD: 0 /100 WBCS — SIGNIFICANT CHANGE UP (ref 0–0)
OPIATES UR-MCNC: NEGATIVE — SIGNIFICANT CHANGE UP
PCP SPEC-MCNC: SIGNIFICANT CHANGE UP
PLATELET # BLD AUTO: 207 K/UL — SIGNIFICANT CHANGE UP (ref 130–400)
PROPOXYPHENE QUALITATIVE URINE RESULT: NEGATIVE — SIGNIFICANT CHANGE UP
RBC # BLD: 3.45 M/UL — LOW (ref 4.2–5.4)
RBC # FLD: 13.1 % — SIGNIFICANT CHANGE UP (ref 11.5–14.5)
WBC # BLD: 12.01 K/UL — HIGH (ref 4.8–10.8)
WBC # FLD AUTO: 12.01 K/UL — HIGH (ref 4.8–10.8)

## 2018-02-28 RX ADMIN — Medication 600 MILLIGRAM(S): at 17:37

## 2018-02-28 RX ADMIN — ENOXAPARIN SODIUM 40 MILLIGRAM(S): 100 INJECTION SUBCUTANEOUS at 11:51

## 2018-02-28 RX ADMIN — Medication 600 MILLIGRAM(S): at 16:30

## 2018-02-28 RX ADMIN — Medication 1 TABLET(S): at 11:51

## 2018-02-28 RX ADMIN — Medication 325 MILLIGRAM(S): at 11:51

## 2018-02-28 RX ADMIN — OXYCODONE AND ACETAMINOPHEN 1 TABLET(S): 5; 325 TABLET ORAL at 01:36

## 2018-02-28 NOTE — PROGRESS NOTE ADULT - SUBJECTIVE AND OBJECTIVE BOX
Postpartum Note,  Section  She is a  32y woman who is now post-operative day: 1  Vertical skin incision (same incision from previous surgery used).    Subjective:  The patient feels well.  She is OOB to chair ambulating.   She is tolerating clears, will advance to regular diet.  She denies nausea and vomiting.  She has voided x2 since benson removed.  Her pain is controlled.  She reports normal postpartum bleeding.  She is breastfeeding and bottle feeding.    Physical exam:    Vital Signs Last 24 Hrs  T(F): 96.2 (2018 03:31), Max: 98.7 (2018 07:01)  HR: 65   BP: 98/53   BP range: 95/58 - 121/73  RR: 19  SpO2: 100%   I&O's Summary    2018 07:01  -  2018 06:51  --------------------------------------------------------  IN: 3975 mL / OUT: 3050 mL / NET: 925 mL    Gen: NAD  Breast: Soft, nontender, not engorged.  Abdomen: Soft, nontender, no distension , firm uterine fundus at umbilicus.  Incision: dressing changed, vertical skin incision clean, dry, and intact with steri strips  Pelvic: Normal lochia noted  Ext: No calf tenderness, no edema    LABS:                        12.6   7.84  )-----------( 219                   37.8         Assessment and Plan  POD #1 s/p  section  Doing well.  Encourage ambulation.  Advance to regular diet.

## 2018-03-01 RX ADMIN — SIMETHICONE 80 MILLIGRAM(S): 80 TABLET, CHEWABLE ORAL at 01:31

## 2018-03-01 RX ADMIN — Medication 600 MILLIGRAM(S): at 23:27

## 2018-03-01 RX ADMIN — Medication 600 MILLIGRAM(S): at 08:23

## 2018-03-01 RX ADMIN — Medication 1 TABLET(S): at 11:42

## 2018-03-01 RX ADMIN — Medication 600 MILLIGRAM(S): at 13:51

## 2018-03-01 RX ADMIN — Medication 600 MILLIGRAM(S): at 01:28

## 2018-03-01 RX ADMIN — Medication 600 MILLIGRAM(S): at 22:57

## 2018-03-01 RX ADMIN — ENOXAPARIN SODIUM 40 MILLIGRAM(S): 100 INJECTION SUBCUTANEOUS at 11:45

## 2018-03-01 RX ADMIN — Medication 600 MILLIGRAM(S): at 11:43

## 2018-03-01 RX ADMIN — Medication 325 MILLIGRAM(S): at 11:44

## 2018-03-01 NOTE — PROGRESS NOTE ADULT - SUBJECTIVE AND OBJECTIVE BOX
Postpartum Note,  Section  She is a  32y woman who is now post-operative day: 2    Subjective:  The patient feels well.  She is ambulating.   She is tolerating regular diet.  She denies nausea and vomiting.  She is voiding.  Her pain is controlled.  She reports normal postpartum bleeding.  She is breastfeeding and bottle feeding.    Physical exam:    Vital Signs Last 24 Hrs  T(F): 96.9, Max: 98.1  HR: 88  BP: 123/70   RR: 18     Gen: NAD  Breast: Soft, nontender, not engorged.  Abdomen: Soft, nontender, no distension , firm uterine fundus at umbilicus.  Incision: Clean, dry, and intact with steri strips  Pelvic: Normal lochia noted  Ext: No calf tenderness    LABS:                        10.5   12.01 )-----------( 207      ( 2018 06:17 ) --> postop             31.6                         12.6   7.84  )-----------( 219      ( 2018 06:53 ) --> preop             37.8     Assessment and Plan  POD #2 s/p repeat  section  Doing well.  Encourage ambulation.  Anticipate discharge home tomorrow.

## 2018-03-02 ENCOUNTER — TRANSCRIPTION ENCOUNTER (OUTPATIENT)
Age: 33
End: 2018-03-02

## 2018-03-02 VITALS
RESPIRATION RATE: 16 BRPM | SYSTOLIC BLOOD PRESSURE: 120 MMHG | DIASTOLIC BLOOD PRESSURE: 79 MMHG | HEART RATE: 77 BPM | TEMPERATURE: 98 F

## 2018-03-02 RX ORDER — IBUPROFEN 200 MG
1 TABLET ORAL
Qty: 0 | Refills: 0 | COMMUNITY
Start: 2018-03-02

## 2018-03-02 RX ORDER — ACETAMINOPHEN 500 MG
2 TABLET ORAL
Qty: 0 | Refills: 0 | COMMUNITY
Start: 2018-03-02

## 2018-03-02 NOTE — DISCHARGE NOTE OB - MATERIALS PROVIDED
[facility] Bouse Screening Program/Bouse  Immunization Record/Shaken Baby Prevention Handout/Vaccinations/Tdap Vaccination (VIS Pub Date: 2012)/MMR Vaccination (VIS Pub Date: 2012)/Birth Certificate Instructions/[facility] Hearing Screen Program

## 2018-03-02 NOTE — PROGRESS NOTE ADULT - SUBJECTIVE AND OBJECTIVE BOX
Postpartum Note,  Section  She is a  32y woman who is now post-operative day: 3    Subjective:  The patient feels well.  She is ambulating.   She is tolerating regular diet.  She denies nausea and vomiting.  She is voiding.  Her pain is controlled.  She reports normal postpartum bleeding.  She is breastfeeding.  She is formula feeding.    Physical exam:    Vital Signs Last 24 Hrs  T(F): 97.9   HR: 70  BP: 124/70  RR: 18 (02 Mar 2018 00:00) (18 - 18)    Gen: NAD  Breast: Soft, nontender, not engorged.  Abdomen: Soft, nontender, no distension , firm uterine fundus at umbilicus.  Incision: Clean, dry, and intact with steri strips  Pelvic: Normal lochia noted  Ext: No calf tenderness    LABS:                        10.5   12.01 )-----------( 207      ( 2018 06:17 )             31.6       Assessment and Plan  POD #3 s/p  section  Doing well.  Pain controlled with motrin.  Discharge home today with 1 week follow up.

## 2018-03-02 NOTE — DISCHARGE NOTE OB - CARE PLAN
Principal Discharge DX:	Delivered by  section  Goal:	healthy mother  Assessment and plan of treatment:	keep incision clean and dry, wash with soap and water, nothing in the vagina for 6 weeks (no tampons, sexual intercourse, douching, tub baths, swimming pools) may shower

## 2018-03-02 NOTE — DISCHARGE NOTE OB - MEDICATION SUMMARY - MEDICATIONS TO TAKE
I will START or STAY ON the medications listed below when I get home from the hospital:    acetaminophen 325 mg oral tablet  -- 2 tab(s) by mouth every 6 hours, As needed, For Temp greater than 38.5 C (101.3 F)  -- Indication: For pain    ibuprofen 600 mg oral tablet  -- 1 tab(s) by mouth every 6 hours, As needed, Mild pain or headache  -- Indication: For pain

## 2018-03-02 NOTE — DISCHARGE NOTE OB - PLAN OF CARE
healthy mother keep incision clean and dry, wash with soap and water, nothing in the vagina for 6 weeks (no tampons, sexual intercourse, douching, tub baths, swimming pools) may shower

## 2018-03-02 NOTE — DISCHARGE NOTE OB - CARE PROVIDER_API CALL
Cem Krishnamurthy), Obstetrics and Gynecology  [facility] [place] [address]  Phone: [phone]  Fax: [phone] Vesta De La Fuente), Obstetrics and Gynecology  [address] [place] [address]  Phone: [phone]  Fax: [phone]

## 2018-03-02 NOTE — DISCHARGE NOTE OB - PATIENT PORTAL LINK FT
You can access the [facility] Patient Portal, offered by [facility], by registering with the following website: [email or web address]

## 2018-03-06 DIAGNOSIS — N73.6 FEMALE PELVIC PERITONEAL ADHESIONS (POSTINFECTIVE): ICD-10-CM

## 2018-03-06 DIAGNOSIS — O99.89 OTHER SPECIFIED DISEASES AND CONDITIONS COMPLICATING PREGNANCY, CHILDBIRTH AND THE PUERPERIUM: ICD-10-CM

## 2018-03-06 DIAGNOSIS — N85.8 OTHER SPECIFIED NONINFLAMMATORY DISORDERS OF UTERUS: ICD-10-CM

## 2018-03-06 DIAGNOSIS — Z33.1 PREGNANT STATE, INCIDENTAL: ICD-10-CM

## 2018-03-06 DIAGNOSIS — O34.212 MATERNAL CARE FOR VERTICAL SCAR FROM PREVIOUS CESAREAN DELIVERY: ICD-10-CM

## 2018-03-06 DIAGNOSIS — Z3A.39 39 WEEKS GESTATION OF PREGNANCY: ICD-10-CM

## 2018-03-08 ENCOUNTER — OUTPATIENT (OUTPATIENT)
Dept: OUTPATIENT SERVICES | Facility: HOSPITAL | Age: 33
LOS: 1 days | Discharge: HOME | End: 2018-03-08

## 2018-03-08 ENCOUNTER — APPOINTMENT (OUTPATIENT)
Dept: OBGYN | Facility: CLINIC | Age: 33
End: 2018-03-08

## 2018-03-08 VITALS — HEIGHT: 62 IN | DIASTOLIC BLOOD PRESSURE: 76 MMHG | SYSTOLIC BLOOD PRESSURE: 120 MMHG

## 2018-03-08 DIAGNOSIS — Z51.89 ENCOUNTER FOR OTHER SPECIFIED AFTERCARE: ICD-10-CM

## 2018-03-08 LAB — SURGICAL PATHOLOGY STUDY: SIGNIFICANT CHANGE UP

## 2018-03-16 ENCOUNTER — APPOINTMENT (OUTPATIENT)
Dept: OBGYN | Facility: CLINIC | Age: 33
End: 2018-03-16

## 2018-03-16 ENCOUNTER — OUTPATIENT (OUTPATIENT)
Dept: OUTPATIENT SERVICES | Facility: HOSPITAL | Age: 33
LOS: 1 days | Discharge: HOME | End: 2018-03-16

## 2018-03-16 VITALS — SYSTOLIC BLOOD PRESSURE: 110 MMHG | BODY MASS INDEX: 26.89 KG/M2 | WEIGHT: 147 LBS | DIASTOLIC BLOOD PRESSURE: 60 MMHG

## 2018-03-22 ENCOUNTER — APPOINTMENT (OUTPATIENT)
Dept: OBGYN | Facility: CLINIC | Age: 33
End: 2018-03-22

## 2018-03-22 ENCOUNTER — OUTPATIENT (OUTPATIENT)
Dept: OUTPATIENT SERVICES | Facility: HOSPITAL | Age: 33
LOS: 1 days | Discharge: HOME | End: 2018-03-22

## 2018-03-22 VITALS — DIASTOLIC BLOOD PRESSURE: 60 MMHG | WEIGHT: 146 LBS | BODY MASS INDEX: 26.7 KG/M2 | SYSTOLIC BLOOD PRESSURE: 100 MMHG

## 2018-04-05 ENCOUNTER — APPOINTMENT (OUTPATIENT)
Dept: OBGYN | Facility: CLINIC | Age: 33
End: 2018-04-05

## 2018-04-05 ENCOUNTER — OUTPATIENT (OUTPATIENT)
Dept: OUTPATIENT SERVICES | Facility: HOSPITAL | Age: 33
LOS: 1 days | Discharge: HOME | End: 2018-04-05

## 2018-04-05 VITALS — WEIGHT: 144 LBS | BODY MASS INDEX: 26.34 KG/M2 | DIASTOLIC BLOOD PRESSURE: 64 MMHG | SYSTOLIC BLOOD PRESSURE: 106 MMHG

## 2018-04-12 ENCOUNTER — APPOINTMENT (OUTPATIENT)
Dept: OBGYN | Facility: CLINIC | Age: 33
End: 2018-04-12

## 2018-04-12 ENCOUNTER — OUTPATIENT (OUTPATIENT)
Dept: OUTPATIENT SERVICES | Facility: HOSPITAL | Age: 33
LOS: 1 days | Discharge: HOME | End: 2018-04-12

## 2018-04-12 VITALS — BODY MASS INDEX: 26.16 KG/M2 | DIASTOLIC BLOOD PRESSURE: 60 MMHG | SYSTOLIC BLOOD PRESSURE: 90 MMHG | WEIGHT: 143 LBS

## 2018-05-10 ENCOUNTER — OUTPATIENT (OUTPATIENT)
Dept: OUTPATIENT SERVICES | Facility: HOSPITAL | Age: 33
LOS: 1 days | Discharge: HOME | End: 2018-05-10

## 2018-05-10 ENCOUNTER — APPOINTMENT (OUTPATIENT)
Dept: OBGYN | Facility: CLINIC | Age: 33
End: 2018-05-10

## 2018-05-10 VITALS — BODY MASS INDEX: 26.89 KG/M2 | WEIGHT: 147 LBS | DIASTOLIC BLOOD PRESSURE: 60 MMHG | SYSTOLIC BLOOD PRESSURE: 100 MMHG

## 2018-05-11 DIAGNOSIS — Z30.09 ENCOUNTER FOR OTHER GENERAL COUNSELING AND ADVICE ON CONTRACEPTION: ICD-10-CM

## 2019-12-04 ENCOUNTER — APPOINTMENT (OUTPATIENT)
Dept: ANTEPARTUM | Facility: CLINIC | Age: 34
End: 2019-12-04
Payer: MEDICAID

## 2019-12-04 ENCOUNTER — OUTPATIENT (OUTPATIENT)
Dept: OUTPATIENT SERVICES | Facility: HOSPITAL | Age: 34
LOS: 1 days | Discharge: HOME | End: 2019-12-04

## 2019-12-04 ENCOUNTER — ASOB RESULT (OUTPATIENT)
Age: 34
End: 2019-12-04

## 2019-12-04 VITALS
BODY MASS INDEX: 30.77 KG/M2 | DIASTOLIC BLOOD PRESSURE: 66 MMHG | TEMPERATURE: 98.3 F | OXYGEN SATURATION: 99 % | HEART RATE: 91 BPM | WEIGHT: 168.25 LBS | SYSTOLIC BLOOD PRESSURE: 110 MMHG

## 2019-12-04 DIAGNOSIS — Z98.891 HISTORY OF UTERINE SCAR FROM PREVIOUS SURGERY: ICD-10-CM

## 2019-12-04 DIAGNOSIS — Z34.90 ENCOUNTER FOR SUPERVISION OF NORMAL PREGNANCY, UNSPECIFIED, UNSPECIFIED TRIMESTER: ICD-10-CM

## 2019-12-04 DIAGNOSIS — O09.90 SUPERVISION OF HIGH RISK PREGNANCY, UNSPECIFIED, UNSPECIFIED TRIMESTER: ICD-10-CM

## 2019-12-04 DIAGNOSIS — O13.9 GESTATIONAL [PREGNANCY-INDUCED] HYPERTENSION W/OUT SIGNIFICANT PROTEINURIA, UNSPECIFIED TRIMESTER: ICD-10-CM

## 2019-12-04 LAB
FETAL HEART DESCRIPTION: NORMAL
FETAL HEART RATE (BPM): NORMAL
FETAL MOVEMENT: PRESENT
GLUCOSE BLDC GLUCOMTR-MCNC: 85
GLUCOSE BLDC GLUCOMTR-MCNC: 85 MG/DL — SIGNIFICANT CHANGE UP (ref 70–99)
OB COMMENTS: NORMAL

## 2019-12-04 PROCEDURE — 76819 FETAL BIOPHYS PROFIL W/O NST: CPT | Mod: 26

## 2019-12-04 PROCEDURE — 76816 OB US FOLLOW-UP PER FETUS: CPT | Mod: 26

## 2019-12-04 PROCEDURE — 99214 OFFICE O/P EST MOD 30 MIN: CPT | Mod: 25

## 2019-12-04 RX ORDER — CEPHALEXIN 500 MG/1
500 CAPSULE ORAL
Qty: 20 | Refills: 0 | Status: DISCONTINUED | COMMUNITY
Start: 2018-03-16 | End: 2019-12-04

## 2019-12-05 NOTE — DISCUSSION/SUMMARY
[FreeTextEntry1] : 35 yo  at 35w2d with KANCHAN /6 by first trimester sono, late transfer to [facility], to establish prenatal care.\par \par #Suspected GDM\par -, GTT 82/153/10/76\par -Supposed to repeat GTT this week, has not\par -Hx of GDm in previous pregnancy\par -Udip with 2000 glucuse\par -Macrosomia and polyhdramios today\par -HgA1C 5.8 on \par -Will treat as suspected GDM\par -Discussed risks of GDM including but not limited to: macrosomia, shoulder dystocia, increased risk of , stillbirth, NICU admission for hypoglycemia and jaundice, and preeclampsia. Explained that if sugars are well controlled the above complications decrease.\par - Patient should start to measure FS four times a day. FS goal fasting <95, 2 hour PP<120\par -At increased risk of developing DM outside of pregnancy - will n eed postpartum GTT and DM testing yearly\par -s/p diabetic teaching and counseling\par --Nutrition next visit\par \par #Pregnancy\par -Pain today - related to sciatica, advised rest, tylenol as needed\par -Prenatal labs and dating reviewed\par -RTC in 1 week for 3rd trimester labs\par -Weekly BPPs\par -Will schedule for repeat  \par Sono today: EFW 3473g (99%), MV 9.25, transverse back up, head maternal left\par \par Labor precautions and fetal kick counts discussed.

## 2019-12-05 NOTE — OB HISTORY
[Pregnancy History] : boy [KANCHAN: ___] : KANCHAN: [unfilled] [LMP: ___] : LMP: [unfilled] [EGA: ___ wks] : EGA: [unfilled] wks [Spontaneous] : Spontaneous conception [at ___ wks] : at [unfilled] weeks [Sonogram] : sonogram [de-identified] : dating by first trimester sonogram [___] : no pregnancy complications reported

## 2019-12-05 NOTE — CHIEF COMPLAINT
[G ___] : G [unfilled] [P ___] : P [unfilled] [de-identified] : hx of GDM last pregnancy, likely with GDM this pregnancy

## 2019-12-05 NOTE — HISTORY OF PRESENT ILLNESS
[FreeTextEntry1] : 35 yo  at 35w2d with KANCHAN 1/6 by first trimester sonogram presents for new OB visit. Pt was following with regular prenatal care in [facility] clinic, moved here and would like to deliver at [facility]. Presents here as she was seen in last pregnancy at [facility] for GDM. Elevated GCT but normal GTT, was meant to repeat GTT this week. Denies other complications during this pregnancy. Good FM, no LOF, VB or contractions.

## 2019-12-05 NOTE — END OF VISIT
[] : Resident [FreeTextEntry3] : In view of late presentation and LGA will treat as a gestational diabetic. Patient is going through a divorce. Declined to see .

## 2019-12-05 NOTE — PHYSICAL EXAM
[FreeTextEntry1] : Gen: NAD, AAOx3\par CVS: RRR, normal S1, S2\par Lungs: CTAB\par Abd: soft, non tender, no palpable contractions, no RUQ/epigastric tenderness\par LE: no edema or tenderness\par Neuro 2+ DTR in bilateral UE

## 2019-12-05 NOTE — SURGICAL HISTORY
[Last Pap: ___] : Last Pap: [unfilled] [Abn Paps] : no abnormal pap smears [Fibroids] : no fibroids [Infertility] : no infertility [Breast Disease] : no breast disease [STI's] : no STI's [Cysts] : no cysts [OC Use] : no OC use

## 2019-12-10 DIAGNOSIS — O09.90 SUPERVISION OF HIGH RISK PREGNANCY, UNSPECIFIED, UNSPECIFIED TRIMESTER: ICD-10-CM

## 2019-12-10 DIAGNOSIS — O24.919 UNSPECIFIED DIABETES MELLITUS IN PREGNANCY, UNSPECIFIED TRIMESTER: ICD-10-CM

## 2019-12-10 DIAGNOSIS — O36.60X0 MATERNAL CARE FOR EXCESSIVE FETAL GROWTH, UNSPECIFIED TRIMESTER, NOT APPLICABLE OR UNSPECIFIED: ICD-10-CM

## 2019-12-10 DIAGNOSIS — Z3A.35 35 WEEKS GESTATION OF PREGNANCY: ICD-10-CM

## 2019-12-10 DIAGNOSIS — Z98.891 HISTORY OF UTERINE SCAR FROM PREVIOUS SURGERY: ICD-10-CM

## 2019-12-11 ENCOUNTER — APPOINTMENT (OUTPATIENT)
Dept: OBGYN | Facility: CLINIC | Age: 34
End: 2019-12-11

## 2019-12-11 ENCOUNTER — APPOINTMENT (OUTPATIENT)
Dept: ANTEPARTUM | Facility: CLINIC | Age: 34
End: 2019-12-11

## 2019-12-18 ENCOUNTER — APPOINTMENT (OUTPATIENT)
Dept: OBGYN | Facility: CLINIC | Age: 34
End: 2019-12-18

## 2019-12-18 ENCOUNTER — APPOINTMENT (OUTPATIENT)
Dept: ANTEPARTUM | Facility: CLINIC | Age: 34
End: 2019-12-18

## 2019-12-24 ENCOUNTER — APPOINTMENT (OUTPATIENT)
Dept: ANTEPARTUM | Facility: CLINIC | Age: 34
End: 2019-12-24

## 2019-12-24 ENCOUNTER — APPOINTMENT (OUTPATIENT)
Dept: OBGYN | Facility: CLINIC | Age: 34
End: 2019-12-24

## 2020-10-18 NOTE — OB RN INTRAOPERATIVE RECORD - NS_UTERINECLOSURE_OBGYN_ALL_OB_DT
27-Feb-2018 09:27 [Fever] : no fever [Chills] : no chills [Feeling Poorly] : feeling poorly [As Noted in HPI] : as noted in HPI [Feeling Tired] : feeling tired [Loss Of Hearing] : no hearing loss [Eye Pain] : eye pain [Nasal Discharge] : no nasal discharge [Chest Pain] : no chest pain [Shortness Of Breath] : no shortness of breath [Palpitations] : no palpitations [Arthralgias] : arthralgias [Cough] : no cough [Joint Stiffness] : joint stiffness [Joint Pain] : joint pain [Neck Pain] : neck pain [Back Pain] : ~T back pain [Skin Lesions] : no skin lesions [Itching] : no itching [Skin Wound] : no skin wound [Dizziness] : dizziness [Sleep Disturbances] : sleep disturbances [Muscle Weakness] : muscle weakness [Swollen Glands] : no swollen glands [Swollen Glands In The Neck] : no swollen glands in the neck

## 2022-01-19 NOTE — OB RN DELIVERY SUMMARY - NS_ADMITLABOR_OBGYN_ALL_OB
[At Term] : at term [Normal Vaginal Route] : by normal vaginal route No [FreeTextEntry4] : resp distress at birth

## 2022-05-27 NOTE — DISCHARGE NOTE OB - HOSPITAL COURSE
pt admitted for scheduled  section, uncomplicated postpartum course, discharged on POD#3
detailed exam

## 2023-05-22 NOTE — OB PROVIDER H&P - PRO CHLAMYDIA RESULT INFANT
[facility] called back, notified per Dr. Claudia Flannery- \"Pass along that if he is fasting He needs to hold the Novolog (Short acting insulin) but should still take his Lantus (Long acting) insulin. \"    Verbalizes understanding and stated patient is not taking aspirin. negative